# Patient Record
Sex: MALE | Race: OTHER
[De-identification: names, ages, dates, MRNs, and addresses within clinical notes are randomized per-mention and may not be internally consistent; named-entity substitution may affect disease eponyms.]

---

## 2019-01-02 ENCOUNTER — HOSPITAL ENCOUNTER (OUTPATIENT)
Dept: HOSPITAL 41 - JD.ED | Age: 34
Setting detail: OBSERVATION
LOS: 2 days | Discharge: HOME | End: 2019-01-04
Attending: SURGERY | Admitting: SURGERY
Payer: COMMERCIAL

## 2019-01-02 DIAGNOSIS — K35.80: Primary | ICD-10-CM

## 2019-01-02 PROCEDURE — 96376 TX/PRO/DX INJ SAME DRUG ADON: CPT

## 2019-01-02 PROCEDURE — 80053 COMPREHEN METABOLIC PANEL: CPT

## 2019-01-02 PROCEDURE — 74018 RADEX ABDOMEN 1 VIEW: CPT

## 2019-01-02 PROCEDURE — 96365 THER/PROPH/DIAG IV INF INIT: CPT

## 2019-01-02 PROCEDURE — 85027 COMPLETE CBC AUTOMATED: CPT

## 2019-01-02 PROCEDURE — 81001 URINALYSIS AUTO W/SCOPE: CPT

## 2019-01-02 PROCEDURE — 96375 TX/PRO/DX INJ NEW DRUG ADDON: CPT

## 2019-01-02 PROCEDURE — 85007 BL SMEAR W/DIFF WBC COUNT: CPT

## 2019-01-02 PROCEDURE — 90686 IIV4 VACC NO PRSV 0.5 ML IM: CPT

## 2019-01-02 PROCEDURE — 99285 EMERGENCY DEPT VISIT HI MDM: CPT

## 2019-01-02 PROCEDURE — 96361 HYDRATE IV INFUSION ADD-ON: CPT

## 2019-01-02 PROCEDURE — 44970 LAPAROSCOPY APPENDECTOMY: CPT

## 2019-01-02 PROCEDURE — 74177 CT ABD & PELVIS W/CONTRAST: CPT

## 2019-01-02 PROCEDURE — G0008 ADMIN INFLUENZA VIRUS VAC: HCPCS

## 2019-01-02 PROCEDURE — 96366 THER/PROPH/DIAG IV INF ADDON: CPT

## 2019-01-02 PROCEDURE — 82150 ASSAY OF AMYLASE: CPT

## 2019-01-02 PROCEDURE — 36415 COLL VENOUS BLD VENIPUNCTURE: CPT

## 2019-01-02 PROCEDURE — G0378 HOSPITAL OBSERVATION PER HR: HCPCS

## 2019-01-02 PROCEDURE — 86140 C-REACTIVE PROTEIN: CPT

## 2019-01-02 NOTE — EDM.PDOC
ED HPI GENERAL MEDICAL PROBLEM





- General


Chief Complaint: Abdominal Pain


Stated Complaint: ABDOMINAL PAIN


Time Seen by Provider: 01/02/19 23:51


Source of Information: Reports: Patient


History Limitations: Reports: No Limitations





- History of Present Illness


INITIAL COMMENTS - FREE TEXT/NARRATIVE: 


33-year-old male presents to the ED with right lower quadrant abdominal pain. 

Patient hasn't felt real well all day today. He did go to work but states he 

really hasn't had much appetite. Little bit of Gatorade to drink. He got home 

at 6:00 tonight and then went rate to bed. He states he did develop a little 

bit of fever and some chills. He did take 600 mg of Advil at about 2000 hrs. 

Initially the pain was mostly periumbilical but now has settled into his right 

lower quadrant of his abdomen. They walk to the hospital he found that he could 

not walk normally much slower rate than normal. States his bowels been working 

normally. He's had no previous abdominal surgery. Pain is well localized to the 

right lower quadrant and better if he's not moving. Does hurt to deep breathe 

and cough.





Onset: Today


Onset Date: 01/02/19


Onset Time: 14:00 (Started to have periumbilical discomfort about 2:00 today 

and then gradually moved down to the right lower quadrant over the last 4-6 

hours.)


Duration: Hour(s):


Location: Reports: Abdomen (Right lower quadrant of the abdomen.)


Quality: Reports: Ache, Other (Pain is constant)


Severity: Moderate (and worse with moving and deep breathing.)


Improves with: Reports: Rest


Worsens with: Reports: Movement (Coughing deep breathing and walking make it 

worse)


Context: Reports: Other.  Denies: Activity, Exercise, Lifting, Sick Contact, 

Trauma


Associated Symptoms: Reports: Fever/Chills (Stated he believed he had a low-

grade fever a few chills about 1800 hrs. tonight.)


Treatments PTA: Reports: NSAIDS (He took 6 mg of Motrin about 2000 hrs.)


  ** Right Lower Abdomen


Pain Score (Numeric/FACES): 6





- Related Data


 Allergies











Allergy/AdvReac Type Severity Reaction Status Date / Time


 


No Known Allergies Allergy   Verified 01/02/19 23:29











Home Meds: 


 Home Meds





. [No Known Home Meds]  01/02/19 [History]











Past Medical History





- Past Health History


Medical/Surgical History: Denies Medical/Surgical History





Social & Family History





- Tobacco Use


Smoking Status *Q: Never Smoker





- Recreational Drug Use


Recreational Drug Use: No





- Living Situation & Occupation


Living situation: Reports: 


Occupation: Employed





ED ROS GENERAL





- Review of Systems


Review Of Systems: See Below


Constitutional: Reports: Fever, Chills, Malaise, Decreased Appetite (Today)


HEENT: Reports: No Symptoms


Respiratory: Reports: No Symptoms


Cardiovascular: Reports: No Symptoms


Endocrine: Reports: No Symptoms


GI/Abdominal: Reports: Abdominal Pain (Right lower quadrant abdominal pain), 

Nausea


: Reports: No Symptoms (Mild nausea.)


Musculoskeletal: Reports: No Symptoms


Skin: Reports: No Symptoms


Neurological: Reports: No Symptoms


Psychiatric: Reports: No Symptoms


Hematologic/Lymphatic: Reports: No Symptoms


Immunologic: Reports: No Symptoms





ED EXAM, GI/ABD





- Physical Exam


Exam: See Below


Exam Limited By: No Limitations


General Appearance: Alert, WD/WN, No Apparent Distress, Other (Does feel very 

slightly warm to palpation.)


Eyes: Bilateral: Normal Appearance (No jaundice)


Throat/Mouth: Other (Tongue is mildly dry.)


Neck: Normal Inspection, Supple, Non-Tender, Full Range of Motion.  No: 

Lymphadenopathy (L), Lymphadenopathy (R)


Respiratory/Chest: No Respiratory Distress, Lungs Clear, Normal Breath Sounds, 

No Accessory Muscle Use, Chest Non-Tender


Cardiovascular: Normal Peripheral Pulses, Regular Rate, Rhythm, No Edema, No 

Gallop, No Murmur, No Rub


GI/Abdominal Exam: No Abnormal Bruit, No Mass, Pelvis Stable, Guarding (Right 

lower quadrant of the abdomen with mild guarding--over McBurney's point), Tender

, Abnormal Bowel Sounds (High-pitched intermittent bowel sounds), Other (Bowel 

sounds are few and far between but tend to be hyperactive in spurts .).  No: 

Rigid, Rebound


 (Male) Exam: No Hernia


Back Exam: Normal Inspection.  No: CVA Tenderness (L), CVA Tenderness (R)


Extremities: Normal Inspection, Normal Range of Motion, Non-Tender, No Pedal 

Edema


Neurological: Alert, Oriented, CN II-XII Intact, Normal Cognition, Normal Gait


Psychiatric: Normal Affect, Normal Mood


Skin Exam: Warm, Dry, Intact, Normal Color, No Rash





Course





- Vital Signs


Last Recorded V/S: 


 Last Vital Signs











Temp  36.5 C   01/02/19 23:30


 


Pulse  85   01/02/19 23:30


 


Resp  18   01/02/19 23:30


 


BP  140/90   01/02/19 23:30


 


Pulse Ox  98   01/02/19 23:30














- Orders/Labs/Meds


Orders: 


 Active Orders 24 hr











 Category Date Time Status


 


 Admission Status [Patient Status] [ADT] Routine ADT  01/03/19 04:41 Ordered


 


 Abdomen 1V Flat [CR] Stat Exams  01/02/19 23:52 Taken


 


 Abdomen Pelvis w Cont [CT] Stat Exams  01/03/19 00:28 Taken


 


 Sodium Chloride 0.9% [Normal Saline] 1,000 ml Med  01/02/19 23:45 Active





 IV ASDIRECTED   


 


 cefOXitin [Mefoxin in Dextrose,Iso-Osm 1 GM/50 ML] 1 gm Med  01/03/19 09:30 

Ordered





 Premix Bag 1 bag   





 IV ONETIME   








 Medication Orders





Sodium Chloride (Normal Saline)  1,000 mls @ 150 mls/hr IV ASDIRECTED ROHINI


   Last Admin: 01/03/19 00:21  Dose: 150 mls/hr








Labs: 


 Laboratory Tests











  01/02/19 01/02/19 01/03/19 Range/Units





  23:55 23:55 00:25 


 


WBC  11.25 H    (4.23-9.07)  K/mm3


 


RBC  5.59    (4.63-6.08)  M/mm3


 


Hgb  17.0    (13.7-17.5)  gm/L


 


Hct  49.4    (40.1-51.0)  %


 


MCV  88.4    (79.0-92.2)  fl


 


MCH  30.4    (25.7-32.2)  pg


 


MCHC  34.4    (32.2-35.5)  g/dl


 


RDW Std Deviation  39.6    (35.1-43.9)  fL


 


Plt Count  252    (163-337)  K/mm3


 


MPV  9.1 L    (9.4-12.3)  fl


 


Neutrophils % (Manual)  85 H    (40-60)  %


 


Band Neutrophils %  1    (0-10)  %


 


Lymphocytes % (Manual)  8 L    (20-40)  %


 


Atypical Lymphs %  0    %


 


Monocytes % (Manual)  5    (2-10)  %


 


Eosinophils % (Manual)  1    (0.8-7.0)  %


 


Basophils % (Manual)  0 L    (0.2-1.2)  


 


Platelet Estimate  Adequate    


 


RBC Morph Comment  Normal    


 


Sodium   139   (136-145)  mEq/L


 


Potassium   3.6   (3.5-5.1)  mEq/L


 


Chloride   102   ()  mEq/L


 


Carbon Dioxide   28   (21-32)  mEq/L


 


Anion Gap   12.6   (5-15)  


 


BUN   15   (7-18)  mg/dL


 


Creatinine   1.0   (0.7-1.3)  mg/dL


 


Est Cr Clr Drug Dosing   88.80   mL/min


 


Estimated GFR (MDRD)   > 60   (>60)  mL/min


 


BUN/Creatinine Ratio   15.0   (14-18)  


 


Glucose   159 H   ()  mg/dL


 


Calcium   9.5   (8.5-10.1)  mg/dL


 


Total Bilirubin   1.5 H   (0.2-1.0)  mg/dL


 


AST   46 H   (15-37)  U/L


 


ALT   142 H   (16-63)  U/L


 


Alkaline Phosphatase   131 H   ()  U/L


 


C-Reactive Protein   < 0.2   (<1.0)  mg/dL


 


Total Protein   7.9   (6.4-8.2)  g/dl


 


Albumin   4.1   (3.4-5.0)  g/dl


 


Globulin   3.8   gm/dL


 


Albumin/Globulin Ratio   1.1   (1-2)  


 


Amylase   65   ()  U/L


 


Urine Color    Yellow  (Yellow)  


 


Urine Appearance    Clear  (Clear)  


 


Urine pH    6.0  (5.0-8.0)  


 


Ur Specific Gravity    1.025  (1.005-1.030)  


 


Urine Protein    Trace H  (Negative)  


 


Urine Glucose (UA)    Negative  (Negative)  


 


Urine Ketones    Negative  (Negative)  


 


Urine Occult Blood    Negative  (Negative)  


 


Urine Nitrite    Negative  (Negative)  


 


Urine Bilirubin    Negative  (Negative)  


 


Urine Urobilinogen    0.2  (0.2-1.0)  


 


Ur Leukocyte Esterase    Negative  (Negative)  


 


Urine RBC    0-5  (0-5)  /hpf


 


Urine WBC    0-5  (0-5)  /hpf


 


Ur Epithelial Cells    0-5  (0-5)  /hpf


 


Urine Bacteria    Not seen  (FEW)  /hpf


 


Urine Mucus    Few  (FEW)  /hpf











Meds: 


Medications











Generic Name Dose Route Start Last Admin





  Trade Name Freq  PRN Reason Stop Dose Admin


 


Sodium Chloride  1,000 mls @ 150 mls/hr  01/02/19 23:45  01/03/19 00:21





  Normal Saline  IV   150 mls/hr





  ASDIRECTED ROHINI   Administration





     





     





     





     














Discontinued Medications














Generic Name Dose Route Start Last Admin





  Trade Name Jace  PRN Reason Stop Dose Admin


 


Hydromorphone HCl  0.5 mg  01/02/19 23:58  01/03/19 00:22





  Dilaudid  IVPUSH  01/02/19 23:59  0.5 mg





  ONETIME ONE   Administration





     





     





     





     


 


Hydromorphone HCl  1 mg  01/03/19 02:04  01/03/19 02:11





  Dilaudid  IVPUSH  01/03/19 02:05  1 mg





  ONETIME ONE   Administration





     





     





     





     


 


Cefoxitin Sodium 1 gm/ Premix  50 mls @ 100 mls/hr  01/03/19 02:24  01/03/19 02:

44





  IV  01/03/19 02:53  100 mls/hr





  ONETIME ONE   Administration





     





     





     





     


 


Metoclopramide HCl  10 mg  01/02/19 23:58  01/03/19 00:22





  Reglan  IVPUSH  01/02/19 23:59  10 mg





  ONETIME ONE   Administration





     





     





     





     


 


Ondansetron HCl  4 mg  01/03/19 02:04  01/03/19 02:10





  Zofran  IVPUSH  01/03/19 02:05  4 mg





  ONETIME ONE   Administration





     





     





     





     














- Radiology Interpretation


Free Text/Narrative:: 


33-year-old male presents to the ED with right lower quadrant abdominal pain. 

Patient hasn't felt well for the last 12-14 hours. He ate very little all day 

even at work. He went right to bed when he got off work at 1800 hrs. States he 

had diffuse periumbilical pain that seemed to get worse around 2:00 in the 

afternoon. Since then over the last 4-6 hours is now settled in his right lower 

quadrant of his abdomen. He did take an ibuprofen 600 mg about 2000 hrs. and he 

did seem to help alleviate the pain. However the pain is constant made worse by 

coughing deep breathing and walking. Denies any diarrhea or. Mildly nauseated. 

Still remains anorexic. Exam reveals guarding and pain well localized to the 

McBurney's point. Suspicion of appendicitis clinically. He also does feel 

slightly warm to palpation. Plan routine labs, urinalysis. IV will be normal 

saline at 150 mils per hour. Given Reglan 10 mg IV and Dilaudid 0.5 mg IV for 

nausea and pain relief. In view of the abdomen will be obtained first. If this 

does not show much will proceed with oral contrast to confirm appendicitis 

since he is not exhibiting severe peritonitis signs at this time








- Re-Assessments/Exams


Free Text/Narrative Re-Assessment/Exam: 





01/03/19 00:27  KUB does reveal increased stool in the cecum and most of the 

right hemicolon colon and parts of the transverse colon. No signs of bowel 

obstruction. Will proceed with CT of the abdomen pelvis with oral and IV 

contrast.





01/03/19 00:50 Labs reveal a slightly elevated white count 11.25 with 85% 

neutrophils and 1% band cells. Hemoglobin is 17.0 with hematocrit of 49.4 

suggesting some degree of hemoconcentration. White count is normal 202,000. 

Sodium 139 with a potassium of 3.6. Chloride 102 with a bicarbonate 28. And a 

gap is 12.6. BUN is 15. Creatinine is 1.0 GFR remains greater than 60. Glucose 

is mildly elevated at 159. Total bilirubin is mildly elevated 1.5. AST is 46 

and ALT is 142. Alk phosphatase is 131. C-reactive protein is pending amylase 

is normal at 65





01/03/19 01:13 C-reactive protein is less than 0.2. Patient reports the pain isn

't too bad as long as he holds still. It's worse with walking. His CT is 

scheduled for about 0135 hrs.





01/03/19 02:02  CT of the abdomen and pelvis has been completed. He does have a 

small hiatal hernia with contrast in the lower esophagus. Visualized lung bases 

are normal. Cardiac silhouette appears normal. Liver appears homogeneous 

without any ductal dilatation. Gallbladder is poorly seen but does not appear 

to contain any obvious calcified gallstones. Pancreas is normal. Both kidneys 

and ureters are normal. Adrenal glands appear normal. There is increased stool 

throughout the right hemicolon and transverse colon. There are few diverticula 

involving the sigmoid colon without any active diverticulitis. Appendix is 

visualized and is dilated to about 1 cm in size with mild patsy-appendiceal 

stranding compatible with early appendicitis. No signs of perforation or 

abscess. Plan I will give him further medication for pain relief Dilaudid 1mg  

and speak with the surgeon on call. He will also be started on antibiotics per 

surgeon's preference. I believe he could be operated on later this morning.





01/03/19 03:54  [facility] report has not yet become available. Patient is sleeping.





01/03/19 04:38 [facility] radiologist called to confirm findings of appendicitis on 

CT previously appreciated on my exam. I therefore did speak with on call 

surgeon Dr. Berrios and she requested the patient be admitted to the med 

surgery floor. I did write bridge orders in this regard. She will see him later 

this morning with plan for appendectomy and doubly around 10:30 this morning.














Departure





- Departure


Time of Disposition: 04:40


Disposition: Refer to Observation


Condition: Fair


Clinical Impression: 


Appendicitis, acute


Qualifiers:


 Acute appendicitis type: with localized peritonitis Appendicitis gangrene 

presence: without gangrene Appendicitis perforation presence: without 

perforation Appendicitis abscess presence: without abscess Qualified Code(s): 

K35.30 - Acute appendicitis with localized peritonitis, without perforation or 

gangrene








- Discharge Information


*PRESCRIPTION DRUG MONITORING PROGRAM REVIEWED*: Not Applicable


*COPY OF PRESCRIPTION DRUG MONITORING REPORT IN PATIENT [facility]: Not Applicable


Referrals: 


PCP,None [Primary Care Provider] - 


Forms:  ED Department Discharge


Additional Instructions: 


Bridge orders written for the patient be admitted to the med surgery floor 

under the care of .





- My Orders


Last 24 Hours: 


My Active Orders





01/02/19 23:45


Sodium Chloride 0.9% [Normal Saline] 1,000 ml IV ASDIRECTED 





01/02/19 23:52


Abdomen 1V Flat [CR] Stat 





01/03/19 00:28


Abdomen Pelvis w Cont [CT] Stat 





01/03/19 04:41


Admission Status [Patient Status] [ADT] Routine 





01/03/19 09:30


cefOXitin [Mefoxin in Dextrose,Iso-Osm 1 GM/50 ML] 1 gm   Premix Bag 1 bag IV 

ONETIME 














- Assessment/Plan


Last 24 Hours: 


My Active Orders





01/02/19 23:45


Sodium Chloride 0.9% [Normal Saline] 1,000 ml IV ASDIRECTED 





01/02/19 23:52


Abdomen 1V Flat [CR] Stat 





01/03/19 00:28


Abdomen Pelvis w Cont [CT] Stat 





01/03/19 04:41


Admission Status [Patient Status] [ADT] Routine 





01/03/19 09:30


cefOXitin [Mefoxin in Dextrose,Iso-Osm 1 GM/50 ML] 1 gm   Premix Bag 1 bag IV 

ONETIME

## 2019-01-03 RX ADMIN — HYDROCODONE BITARTRATE AND ACETAMINOPHEN PRN TAB: 5; 325 TABLET ORAL at 15:15

## 2019-01-03 RX ADMIN — HEPARIN SODIUM SCH UNITS: 5000 INJECTION, SOLUTION INTRAVENOUS; SUBCUTANEOUS at 15:13

## 2019-01-03 RX ADMIN — HYDROCODONE BITARTRATE AND ACETAMINOPHEN PRN TAB: 5; 325 TABLET ORAL at 20:03

## 2019-01-03 NOTE — PCM.OPNOTE
- General Post-Op/Procedure Note


Date of Surgery/Procedure: 01/03/19


Operative Procedure(s): Laparoscopic appendectomy


Findings: 





acute appendicitis, non-ruptured


Pre Op Diagnosis: acute appendicitis


Post-Op Diagnosis: same


Anesthesia Technique: General ET Tube


Primary Surgeon: Argenis Berrios


Anesthesia Provider: Jhon Zamudio


Pathology: 





appendix


Fluid Replacement, Intraop: 1,000


Output, Urine Amount: 0


EBL in mLs: 20


Complications: none apparent


Condition: Good

## 2019-01-03 NOTE — CT
CT abdomen and pelvis

 

Technique: Multiple axial sections were obtained from above the dome 

of the diaphragm inferiorly through the pubic symphysis.  Intravenous 

and oral contrast was utilized.  Delayed images were also obtained 

through the abdomen and pelvis.

 

Comparison: Previous abdominal x-ray performed earlier on the same day

 (12:11 AM).

 

Enlarged appendix is seen with minimal surrounding inflammatory 

change.  Findings are compatible with early appendicitis.

 

Visualized lung bases show nothing acute.  Minimal fatty infiltration 

is noted within the liver.  Spleen appears within normal limits.  

Adrenal glands show no nodule.  Pancreas is within normal limits.  

Kidneys show symmetric contrast enhancement without hydronephrosis or 

mass.  Delayed images show contrast within the ureters which show no 

dilatation.  Contrast noted within the bladder on delayed images.

 

Aorta shows no aneurysm.  Gallbladder contains no calcified 

gallstones.  No retroperitoneal adenopathy or mesenteric abnormalities

 are seen.  No pelvic mass or adenopathy is seen.

 

Small amount of contrast is noted within the distal esophagus 

compatible with mild gastroesophageal reflux.

 

Bone window settings were reviewed which appear within normal limits 

for the patient's age.

 

Impression:

1.  Enlarged appendix with slight surrounding inflammatory change 

compatible with early appendicitis.

2.  Mild gastroesophageal reflux.

3.  Minimal fatty infiltration within the liver.

 

Diagnostic code #5

 

I agree with preliminary report from [facility], finalized on 01/03/19, 5:21

 AM Central Time

## 2019-01-03 NOTE — CR
Abdomen: Supine view of the abdomen was obtained.

 

Comparison: No prior abdominal x-ray.

 

Bowel gas pattern is normal.  Bony structures are unremarkable.  No 

abnormal calcifications or soft tissue abnormality is seen.

 

Impression:

1.  Unremarkable supine abdominal x-ray.

 

Diagnostic code #1

## 2019-01-03 NOTE — PCM.POSTAN
POST ANESTHESIA ASSESSMENT





- MENTAL STATUS


Mental Status: Alert, Oriented





- VITAL SIGNS


Pulse Rate: 94


SaO2: 100


Resp Rate: 16


Blood Pressure: 125/87


Temperature: 98.4 F





- RESPIRATORY


Respiratory Status: Respiratory Rate WNL, Airway Patent, O2 Saturation Stable, 

Supplemental Oxygen





- CARDIOVASCULAR


CV Status: Pulse Rate WNL, Blood Pressure Stable





- GASTROINTESTINAL


GI Status: No Symptoms





- PAIN


Pain Score: 3





- POST OP HYDRATION


Hydration Status: Adequate & Stable

## 2019-01-03 NOTE — PCM.PRNOTE
- Free Text/Narrative


Note: 





Operative Report





Date of surgery: January 3, 2019


Preoperative diagnosis: acute appendicitis.


Postoperative diagnosis: same


Procedure performed: laparoscopic appendectomy





Surgeon: Dr. Argenis Berrios


Anesthesia:  General 


Anesthetist: Jhon Zamudio CRNA





Estimated blood loss 20 mL


IV fluids: 1000 mL crystalloid


Urine output: 0


Drains and lines: .  None





Findings: Acute appendicitis, nonruptured


Pathology: Appendix





Indications for procedure:  The patient is a 33-year-old gentleman who 

presented to the emergency department complaining of abdominal pain that was 

consistent with acute appendicitis.  He underwent CT scan of the abdomen and 

pelvis which revealed a dilated inflamed appendix.  He also had laboratory 

testing which revealed a mildly elevated WBC with a neutrophil predominance.  

He is admitted to the floor and given IV antibiotics in preparation for 

appendectomy.  He was counseled for laparoscopic appendectomy with discussion 

of the risks.  His written consent was obtained. 





Description of procedure: The patient was taken back to the operating room and 

placed in supine position on the operating table. SCD boots were in place and 

functional prior to the start of the procedure.  Preoperative antibiotics were 

not given since he had received a recent dose of IV antibiotics on the surgical 

floor. [] The patient had successful induction of general anesthesia and was 

intubated without difficulty.  Pt was then prepped and draped in standard 

surgical fashion and a timeout was performed.





We began by making a 15 mm incision in the infraumbilical skin and deepened 

down to level of the fascia which was then grasped and incised sharply.  We 

entered the peritoneum and then placed stay sutures of 0 Vicryl on the fascial 

edges.  A 12 mm Russell port was then placed into the umbilicus and the balloon 

was inflated.  The abdomen was insufflated to 15 mmHg a 5 mm camera was 

inserted.  There was no evidence of any injury created from entry into the 

abdomen.  A TA P block was performed using mixed 1% lidocaine with epinephrine 

and 0.5% bupivacaine with epinephrine . We then proceeded to place a 5 mm port 

under direct visualization in the suprapubic midline and an additional 5mm port 

in the left lower quadrant.  The patient was then positioned in Trendelenburg 

with right side elevated and we proceeded to mobilize the appendix.  The 

appendix was injected, consistent with acute appendicitis.  There was a scant 

amount of fluid in the abdomen, with no evidence of purulence or rupture from 

the appendix.  The appendix was then grasped and with blunt dissection was 

brought into the surgical field.  The mesoappendix dissected from the appendix.

  The appendix was then taken with a tissue staple load.  The mesoappendix was 

then taken with .  A vascular staple load.





The specimen was in place in the Endo Catch bag.  We then inspected and blotted 

up any blood in the area using a Ray-Davida in the abdomen.  There was no active 

bleeding at the end of this case.  All Ray-Davida's were removed. The abdomen was 

then desufflated and the umbilical fascia closed with 0 Vicryl sutures and the 

stay sutures were tied, effectively closing the umbilical port site.  The skin 

was then reapproximated at all port sites using a 4-0 Monocryl subcutaneous 

stitch and covered with Dermabond surgical glue.





The patient tolerated the procedure.  He was extubated and transported to the 

PACU in stable condition.





All sponge and needle counts were correct. 





Argenis Berrios MD


General Surgery

## 2019-01-03 NOTE — PCM.HP
H&P History of Present Illness





- General


Date of Service: 01/03/19


Admit Problem/Dx: 


 Admission Diagnosis/Problem





Admission Diagnosis/Problem      Acute appendicitis








Source of Information: Patient, Provider


History Limitations: Reports: No Limitations





- History of Present Illness


Initial Comments - Free Text/Narative: 





Pt is a 34 y/o male who presents with RLQ abdominal pain. He reports more 

diffuse abdominal pain in the lower quadrants, worse with movement and driving 

over bumps. He reports having diarrhea yesterday after drinking the PO contrast 

for the CT scan. He had some rigors and chills on the way to the hospital. He 

has been feeling improved since his admission and pain is better. He denies 

nausea/vomiting.


  ** Right Lower Abdomen


Pain Score (Numeric/FACES): 6





- Related Data


Allergies/Adverse Reactions: 


 Allergies











Allergy/AdvReac Type Severity Reaction Status Date / Time


 


No Known Allergies Allergy   Verified 01/03/19 07:38











Home Medications: 


 Home Meds





. [No Known Home Meds]  01/02/19 [History]











Past Medical History





- Past Health History


Medical/Surgical History: Denies Medical/Surgical History





- Past Surgical History


Musculoskeletal Surgical History: Reports: Other (See Below)


Other Musculoskeletal Surgeries/Procedures:: history of left broken leg and has 

not had any surgery on it.  





Social & Family History





- Family History


Endocrine/Metabolic: Reports: Diabetes, type II





- Tobacco Use


Smoking Status *Q: Never Smoker


Second Hand Smoke Exposure: No





- Caffeine Use


Caffeine Use: Reports: Coffee, Soda


Other Caffeine Use: one cup of coffee every week and one can of soda maybe 

every 3 weeks.





- Recreational Drug Use


Recreational Drug Use: No





- Living Situation & Occupation


Living situation: Reports: 


Occupation: Employed





H&P Review of Systems





- Review of Systems:


Review Of Systems: See Below


General: Reports: Fever (subjective), Chills


HEENT: Reports: No Symptoms


Pulmonary: Reports: No Symptoms


Cardiovascular: Reports: No Symptoms


Gastrointestinal: Reports: Abdominal Pain, Diarrhea.  Denies: Black Stool, 

Bloody Stool


Genitourinary: Reports: No Symptoms


Musculoskeletal: Reports: No Symptoms


Skin: Reports: No Symptoms


Psychiatric: Reports: No Symptoms


Neurological: Reports: No Symptoms





Exam





- Exam


Exam: See Below





- Vital Signs


Vital Signs: 


 Last Vital Signs











Temp  36.7 C   01/03/19 10:06


 


Pulse  59 L  01/03/19 10:06


 


Resp  18   01/03/19 10:06


 


BP  122/68   01/03/19 10:06


 


Pulse Ox  97   01/03/19 10:06











Weight: 76.566 kg





- Exam


General: Alert, Oriented


HEENT: Conjunctiva Clear, EOMI


Neck: Supple


Lungs: Clear to Auscultation, Normal Respiratory Effort


Cardiovascular: Regular Rate, Regular Rhythm


GI/Abdominal Exam: Soft, Distended (mild), Rebound (in RLQ), Tender (in Right 

hemiabdomen), Other (rovsing's sign)


Neurological: Cranial Nerves Intact


Neuro Extensive - Mental Status: Alert, Normal Mood/Affect





- Patient Data


Lab Results Last 24 hrs: 


 Laboratory Results - last 24 hr











  01/02/19 01/02/19 01/03/19 Range/Units





  23:55 23:55 00:25 


 


WBC  11.25 H    (4.23-9.07)  K/mm3


 


RBC  5.59    (4.63-6.08)  M/mm3


 


Hgb  17.0    (13.7-17.5)  gm/L


 


Hct  49.4    (40.1-51.0)  %


 


MCV  88.4    (79.0-92.2)  fl


 


MCH  30.4    (25.7-32.2)  pg


 


MCHC  34.4    (32.2-35.5)  g/dl


 


RDW Std Deviation  39.6    (35.1-43.9)  fL


 


Plt Count  252    (163-337)  K/mm3


 


MPV  9.1 L    (9.4-12.3)  fl


 


Neutrophils % (Manual)  85 H    (40-60)  %


 


Band Neutrophils %  1    (0-10)  %


 


Lymphocytes % (Manual)  8 L    (20-40)  %


 


Atypical Lymphs %  0    %


 


Monocytes % (Manual)  5    (2-10)  %


 


Eosinophils % (Manual)  1    (0.8-7.0)  %


 


Basophils % (Manual)  0 L    (0.2-1.2)  


 


Platelet Estimate  Adequate    


 


RBC Morph Comment  Normal    


 


Sodium   139   (136-145)  mEq/L


 


Potassium   3.6   (3.5-5.1)  mEq/L


 


Chloride   102   ()  mEq/L


 


Carbon Dioxide   28   (21-32)  mEq/L


 


Anion Gap   12.6   (5-15)  


 


BUN   15   (7-18)  mg/dL


 


Creatinine   1.0   (0.7-1.3)  mg/dL


 


Est Cr Clr Drug Dosing   88.80   mL/min


 


Estimated GFR (MDRD)   > 60   (>60)  mL/min


 


BUN/Creatinine Ratio   15.0   (14-18)  


 


Glucose   159 H   ()  mg/dL


 


Calcium   9.5   (8.5-10.1)  mg/dL


 


Total Bilirubin   1.5 H   (0.2-1.0)  mg/dL


 


AST   46 H   (15-37)  U/L


 


ALT   142 H   (16-63)  U/L


 


Alkaline Phosphatase   131 H   ()  U/L


 


C-Reactive Protein   < 0.2   (<1.0)  mg/dL


 


Total Protein   7.9   (6.4-8.2)  g/dl


 


Albumin   4.1   (3.4-5.0)  g/dl


 


Globulin   3.8   gm/dL


 


Albumin/Globulin Ratio   1.1   (1-2)  


 


Amylase   65   ()  U/L


 


Urine Color    Yellow  (Yellow)  


 


Urine Appearance    Clear  (Clear)  


 


Urine pH    6.0  (5.0-8.0)  


 


Ur Specific Gravity    1.025  (1.005-1.030)  


 


Urine Protein    Trace H  (Negative)  


 


Urine Glucose (UA)    Negative  (Negative)  


 


Urine Ketones    Negative  (Negative)  


 


Urine Occult Blood    Negative  (Negative)  


 


Urine Nitrite    Negative  (Negative)  


 


Urine Bilirubin    Negative  (Negative)  


 


Urine Urobilinogen    0.2  (0.2-1.0)  


 


Ur Leukocyte Esterase    Negative  (Negative)  


 


Urine RBC    0-5  (0-5)  /hpf


 


Urine WBC    0-5  (0-5)  /hpf


 


Ur Epithelial Cells    0-5  (0-5)  /hpf


 


Urine Bacteria    Not seen  (FEW)  /hpf


 


Urine Mucus    Few  (FEW)  /hpf











Result Diagrams: 


 01/02/19 23:55





 01/02/19 23:55





*Q Meaningful Use (ADM)





- VTE Risk Assess *Q


Each Risk Factor Represents 1 Point: Minor Surgery Planned


Total Score 1 Point Risk Factors: 1





- Problem List


(1) Appendicitis, acute


SNOMED Code(s): 10259880


   ICD Code: K35.80 - UNSPECIFIED ACUTE APPENDICITIS   Status: Acute   Current 

Visit: Yes   


Qualifiers: 


   Acute appendicitis type: with localized peritonitis   Appendicitis gangrene 

presence: without gangrene   Appendicitis perforation presence: without 

perforation   Appendicitis abscess presence: without abscess   Qualified Code(s)

: K35.30 - Acute appendicitis with localized peritonitis, without perforation 

or gangrene   


Problem List Initiated/Reviewed/Updated: Yes


Orders Last 24hrs: 


 Active Orders 24 hr











 Category Date Time Status


 


 Admission Status [Patient Status] [ADT] Routine ADT  01/03/19 04:41 Active


 


 Up With Assistance [RC] ASDIRECTED Care  01/03/19 06:58 Active


 


 NPO Now [Nothing per Oral Now Diet] [DIET] Diet  01/03/19 Breakfast Active


 


 Dextrose 5%-0.9% NaCl [Dextrose 5%-Normal Saline] 1,000 Med  01/03/19 07:00 

Active





 ml   





 IV ASDIRECTED   


 


 HYDROmorphone [Dilaudid] Med  01/03/19 07:01 Active





 0.5 mg IVPUSH Q2H PRN   


 


 Metoclopramide [Reglan] Med  01/03/19 07:02 Active





 10 mg IVPUSH Q6H PRN   


 


 Code Status [Resuscitation Status] Routine Resus Stat  01/03/19 06:57 Ordered








 Medication Orders





Hydromorphone HCl (Dilaudid)  0.5 mg IVPUSH Q2H PRN


   PRN Reason: Pain


Dextrose/Sodium Chloride (Dextrose 5%-Normal Saline)  1,000 mls @ 150 mls/hr IV 

ASDIRECTED ROHINI


   Last Admin: 01/03/19 07:26  Dose: 150 mls/hr


Metoclopramide HCl (Reglan)  10 mg IVPUSH Q6H PRN


   PRN Reason: Nausea








Assessment/Plan Comment:: 





34 y/o male with acute appendicitis


- plan for laparoscopic cholecystectomy


- NPO with IVF


- continue cefoxitin IV


- bilateral SCDs with subq heparin


- admitted for observation.





Argenis Berrios MD


General surgery

## 2019-01-03 NOTE — PCM.PREANE
Preanesthetic Assessment





- Procedure


Proposed Procedure: 





lap appy





- Anesthesia/Transfusion/Family Hx


Anesthesia History: No Prior Anesthesia


Family History of Anesthesia Reaction: No


Transfusion History: No Prior Transfusion(s)





- Review of Systems


General: Fever (last night and abd pain)


Pulmonary: No Symptoms


Cardiovascular: No Symptoms


Gastrointestinal: Abdominal Pain, Decreased Appetite


Neurological: No Symptoms





- Physical Assessment


NPO Status Date: 01/03/19


NPO Status Time: 01:00


Pulse: 59


O2 Sat by Pulse Oximetry: 97


Respiratory Rate: 18


Blood Pressure: 122/68


Temperature: 98.1 F


Vital Signs: 





 Last Vital Signs











Temp  98.1 F   01/03/19 10:06


 


Pulse  59 L  01/03/19 10:06


 


Resp  18   01/03/19 10:06


 


BP  122/68   01/03/19 10:06


 


Pulse Ox  97   01/03/19 10:06











Height: 5 ft 4 in


Weight: 76.566 kg


ASA Class: 2


Mental Status: Alert & Oriented x3


Airway Class: Mallampati = 1


Dentition: Reports: Normal Dentition


Thyro-Mental Finger Breadths: 3


Mouth Opening Finger Breadths: 3


ROM/Head Extension: Full


Lungs: Clear to Auscultation, Normal Respiratory Effort


Cardiovascular: Regular Rate, Regular Rhythm





- Lab


Values: 





 Laboratory Last Values











WBC  11.25 K/mm3 (4.23-9.07)  H  01/02/19  23:55    


 


RBC  5.59 M/mm3 (4.63-6.08)   01/02/19  23:55    


 


Hgb  17.0 gm/L (13.7-17.5)   01/02/19  23:55    


 


Hct  49.4 % (40.1-51.0)   01/02/19  23:55    


 


MCV  88.4 fl (79.0-92.2)   01/02/19  23:55    


 


MCH  30.4 pg (25.7-32.2)   01/02/19  23:55    


 


MCHC  34.4 g/dl (32.2-35.5)   01/02/19  23:55    


 


RDW Std Deviation  39.6 fL (35.1-43.9)   01/02/19  23:55    


 


Plt Count  252 K/mm3 (163-337)   01/02/19  23:55    


 


MPV  9.1 fl (9.4-12.3)  L  01/02/19  23:55    


 


Neutrophils % (Manual)  85 % (40-60)  H  01/02/19  23:55    


 


Band Neutrophils %  1 % (0-10)   01/02/19  23:55    


 


Lymphocytes % (Manual)  8 % (20-40)  L  01/02/19  23:55    


 


Atypical Lymphs %  0 %  01/02/19  23:55    


 


Monocytes % (Manual)  5 % (2-10)   01/02/19  23:55    


 


Eosinophils % (Manual)  1 % (0.8-7.0)   01/02/19  23:55    


 


Basophils % (Manual)  0  (0.2-1.2)  L  01/02/19  23:55    


 


Platelet Estimate  Adequate   01/02/19  23:55    


 


RBC Morph Comment  Normal   01/02/19  23:55    


 


Sodium  139 mEq/L (136-145)   01/02/19  23:55    


 


Potassium  3.6 mEq/L (3.5-5.1)   01/02/19  23:55    


 


Chloride  102 mEq/L ()   01/02/19  23:55    


 


Carbon Dioxide  28 mEq/L (21-32)   01/02/19  23:55    


 


Anion Gap  12.6  (5-15)   01/02/19  23:55    


 


BUN  15 mg/dL (7-18)   01/02/19  23:55    


 


Creatinine  1.0 mg/dL (0.7-1.3)   01/02/19  23:55    


 


Est Cr Clr Drug Dosing  88.80 mL/min  01/02/19  23:55    


 


Estimated GFR (MDRD)  > 60 mL/min (>60)   01/02/19  23:55    


 


BUN/Creatinine Ratio  15.0  (14-18)   01/02/19  23:55    


 


Glucose  159 mg/dL ()  H  01/02/19  23:55    


 


Calcium  9.5 mg/dL (8.5-10.1)   01/02/19  23:55    


 


Total Bilirubin  1.5 mg/dL (0.2-1.0)  H  01/02/19  23:55    


 


AST  46 U/L (15-37)  H  01/02/19  23:55    


 


ALT  142 U/L (16-63)  H  01/02/19  23:55    


 


Alkaline Phosphatase  131 U/L ()  H  01/02/19  23:55    


 


C-Reactive Protein  < 0.2 mg/dL (<1.0)   01/02/19  23:55    


 


Total Protein  7.9 g/dl (6.4-8.2)   01/02/19  23:55    


 


Albumin  4.1 g/dl (3.4-5.0)   01/02/19  23:55    


 


Globulin  3.8 gm/dL  01/02/19  23:55    


 


Albumin/Globulin Ratio  1.1  (1-2)   01/02/19  23:55    


 


Amylase  65 U/L ()   01/02/19  23:55    


 


Urine Color  Yellow  (Yellow)   01/03/19  00:25    


 


Urine Appearance  Clear  (Clear)   01/03/19  00:25    


 


Urine pH  6.0  (5.0-8.0)   01/03/19  00:25    


 


Ur Specific Gravity  1.025  (1.005-1.030)   01/03/19  00:25    


 


Urine Protein  Trace  (Negative)  H  01/03/19  00:25    


 


Urine Glucose (UA)  Negative  (Negative)   01/03/19  00:25    


 


Urine Ketones  Negative  (Negative)   01/03/19  00:25    


 


Urine Occult Blood  Negative  (Negative)   01/03/19  00:25    


 


Urine Nitrite  Negative  (Negative)   01/03/19  00:25    


 


Urine Bilirubin  Negative  (Negative)   01/03/19  00:25    


 


Urine Urobilinogen  0.2  (0.2-1.0)   01/03/19  00:25    


 


Ur Leukocyte Esterase  Negative  (Negative)   01/03/19  00:25    


 


Urine RBC  0-5 /hpf (0-5)   01/03/19  00:25    


 


Urine WBC  0-5 /hpf (0-5)   01/03/19  00:25    


 


Ur Epithelial Cells  0-5 /hpf (0-5)   01/03/19  00:25    


 


Urine Bacteria  Not seen /hpf (FEW)   01/03/19  00:25    


 


Urine Mucus  Few /hpf (FEW)   01/03/19  00:25    














- Allergies


Allergies/Adverse Reactions: 


 Allergies











Allergy/AdvReac Type Severity Reaction Status Date / Time


 


No Known Allergies Allergy   Verified 01/03/19 07:38














- Blood


Blood Available: No





- Acknowledgements


Anesthesia Type Planned: General Anesthesia


Pt an Appropriate Candidate for the Planned Anesthesia: Yes


Alternatives and Risks of Anesthesia Discussed w Pt/Guardian: Yes


Pt/Guardian Understands and Agrees with Anesthesia Plan: Yes





PreAnesthesia Questionnaire





- Past Health History


Medical/Surgical History: Denies Medical/Surgical History


Respiratory History: Reports: None


Gastrointestinal History: Reports: None





- Past Surgical History


Musculoskeletal Surgical History: Reports: Other (See Below)


Other Musculoskeletal Surgeries/Procedures:: history of left broken leg and has 

not had any surgery on it.  





- History Comment


History Comment: vitamin and fish oil





- SUBSTANCE USE


Smoking Status *Q: Never Smoker


Tobacco Use Within Last Twelve Months: No


Second Hand Smoke Exposure: Yes


Days Per Week of Alcohol Use: 0


Recreational Drug Use History: No





- HOME MEDS


Home Medications: 


 Home Meds





. [No Known Home Meds]  01/02/19 [History]











- CURRENT (IN HOUSE) MEDS


Current Meds: 





 Current Medications





Hydromorphone HCl (Dilaudid)  0.5 mg IVPUSH Q2H PRN


   PRN Reason: Pain


Dextrose/Sodium Chloride (Dextrose 5%-Normal Saline)  1,000 mls @ 150 mls/hr IV 

[facility]


   Last Admin: 01/03/19 07:26 Dose:  150 mls/hr


Metoclopramide HCl (Reglan)  10 mg IVPUSH Q6H PRN


   PRN Reason: Nausea





Discontinued Medications





Fentanyl (Sublimaze) Confirm Administered Dose 250 mcg .ROUTE .STK-MED ONE


   Stop: 01/03/19 10:48


Hydromorphone HCl (Dilaudid)  0.5 mg IVPUSH ONETIME ONE


   Stop: 01/02/19 23:59


   Last Admin: 01/03/19 00:22 Dose:  0.5 mg


Hydromorphone HCl (Dilaudid)  1 mg IVPUSH ONETIME ONE


   Stop: 01/03/19 02:05


   Last Admin: 01/03/19 02:11 Dose:  1 mg


Sodium Chloride (Normal Saline)  1,000 mls @ 150 mls/hr IV [facility]RECTED [facility]


   Last Admin: 01/03/19 00:21 Dose:  150 mls/hr


Cefoxitin Sodium 1 gm/ Premix  50 mls @ 100 mls/hr IV ONETIME ONE


   Stop: 01/03/19 02:53


   Last Admin: 01/03/19 02:44 Dose:  100 mls/hr


Cefoxitin Sodium 1 gm/ Premix  50 mls @ 100 mls/hr IV ONETIME ONE


   Stop: 01/03/19 10:59


   Last Admin: 01/03/19 10:44 Dose:  100 mls/hr


Lidocaine HCl (Xylocaine-Mpf 1%) Confirm Administered Dose 4 mls @ as directed 

.ROUTE .STK-MED ONE


   Stop: 01/03/19 10:47


Influenza Virus Vaccine (Pharmacy To Dose - Influenza Vaccine)  1 each IM 

ONETIME ONE


   Stop: 01/03/19 07:32


Influenza Virus Vaccine (Fluzone Quad [id number] Syringe)  60 mcg IM .ONCE ONE


   Stop: 01/03/19 07:46


Metoclopramide HCl (Reglan)  10 mg IVPUSH ONETIME ONE


   Stop: 01/02/19 23:59


   Last Admin: 01/03/19 00:22 Dose:  10 mg


Midazolam HCl (Versed 1 Mg/Ml) Confirm Administered Dose 2 mg .ROUTE .STK-MED 

ONE


   Stop: 01/03/19 10:47


Ondansetron HCl (Zofran)  4 mg IVPUSH ONETIME ONE


   Stop: 01/03/19 02:05


   Last Admin: 01/03/19 02:10 Dose:  4 mg


Ondansetron HCl (Zofran) Confirm Administered Dose 4 mg .ROUTE .STK-MED ONE


   Stop: 01/03/19 10:47


Propofol (Diprivan  20 Ml) Confirm Administered Dose 200 mg .ROUTE .STK-MED ONE


   Stop: 01/03/19 10:47


Rocuronium Bromide (Zemuron) Confirm Administered Dose 50 mg .ROUTE .STK-MED ONE


   Stop: 01/03/19 10:47

## 2019-01-04 RX ADMIN — HEPARIN SODIUM SCH UNITS: 5000 INJECTION, SOLUTION INTRAVENOUS; SUBCUTANEOUS at 00:27

## 2019-01-04 RX ADMIN — HYDROCODONE BITARTRATE AND ACETAMINOPHEN PRN TAB: 5; 325 TABLET ORAL at 00:25

## 2019-01-04 RX ADMIN — HEPARIN SODIUM SCH UNITS: 5000 INJECTION, SOLUTION INTRAVENOUS; SUBCUTANEOUS at 07:56

## 2019-01-04 RX ADMIN — HYDROCODONE BITARTRATE AND ACETAMINOPHEN PRN TAB: 5; 325 TABLET ORAL at 07:49

## 2019-01-04 NOTE — PCM.DCSUM1
**Discharge Summary





- Hospital Course


Free Text/Narrative:: 





Pt was admitted through the ED with acute appendicitis. He underwent an 

uneventful appendectomy, and was kept overnight. He tolerated a diet and was 

urinating without difficulty. He was discharged home on POD1. 


Diagnosis: Stroke: No


Modified Violetta Scale: No Signif.Disability Despite Sympt.Able to Carry Out 

Usual Act./Duties


Modified Violetta Scale Score: 1





- Discharge Data


Discharge Date: 01/04/19


Discharge Disposition: Home, Self-Care 01


Condition: Good





- Discharge Diagnosis/Problem(s)


(1) Appendicitis, acute


SNOMED Code(s): 68599082


   ICD Code: K35.80 - UNSPECIFIED ACUTE APPENDICITIS   Status: Acute   Current 

Visit: Yes   


Qualifiers: 


   Acute appendicitis type: with localized peritonitis   Appendicitis gangrene 

presence: without gangrene   Appendicitis perforation presence: without 

perforation   Appendicitis abscess presence: without abscess   Qualified Code(s)

: K35.30 - Acute appendicitis with localized peritonitis, without perforation 

or gangrene   





- Patient Summary/Data


Operative Procedure(s) Performed: Laparoscopic appendectomy





- Patient Instructions


Diet: Usual Diet as Tolerated


Activity: As Tolerated, No Lifting Over 20 Pounds (for two weeks), No Strenuous 

Activities (for 2 weeks)


Activity, Other: No lifting more than 30lbs until 2/1/19.


Showering/Bathing: May Shower, No Tub Bathing/Swimming (for 2 weeks)


Notify Provider of: Fever, Increased Pain, Swelling and Redness, Drainage, 

Nausea and/or Vomiting





- Discharge Plan


*PRESCRIPTION DRUG MONITORING PROGRAM REVIEWED*: Not Applicable


*COPY OF PRESCRIPTION DRUG MONITORING REPORT IN PATIENT [place]: Not Applicable


Prescriptions/Med Rec: 


Ibuprofen 600 mg PO Q6HR PRN 20 Days #120 tablet


 PRN Reason: Pain (Moderate 4-6)


Acetaminophen/HYDROcodone [Norco 325-5 MG] 1 tab PO Q4H PRN 14 Days #30 tablet


 PRN Reason: Pain


Docusate Sodium [Colace] 100 mg PO BID 20 Days #40 cap


Home Medications: 


 Home Meds





Acetaminophen/HYDROcodone [Norco 325-5 MG] 1 tab PO Q4H PRN 14 Days #30 tablet 

01/03/19 [Rx]


Docusate Sodium [Colace] 100 mg PO BID 20 Days #40 cap 01/03/19 [Rx]


Ibuprofen 600 mg PO Q6HR PRN 20 Days #120 tablet 01/03/19 [Rx]








Patient Handouts:  Laparoscopic Appendectomy, Adult, Care After, Easy-to-Read


Referrals: 


PCP,None [Primary Care Provider] - 


Beatriz Bryan NP [Nurse Practitioner] - 01/17/19 9:20 am (You will have a 

follow up appointment to see Beatriz Bryan NP at [facility] on Thursday, 

January 17th at 9:20 AM)





- Discharge Summary/Plan Comment


DC Time >30 min.: No





- Patient Data


Vitals - Most Recent: 


 Last Vital Signs











Temp  36.7 C   01/04/19 07:52


 


Pulse  64   01/04/19 07:52


 


Resp  16   01/04/19 07:52


 


BP  103/69   01/04/19 07:52


 


Pulse Ox  94 L  01/04/19 07:52











Weight - Most Recent: 78.608 kg


I&O - Last 24 hours: 


 Intake & Output











 01/03/19 01/04/19 01/04/19





 22:59 06:59 14:59


 


Intake Total 240 800 


 


Balance 240 800 











Med Orders - Current: 


 Current Medications





Hydrocodone Bitart/Acetaminophen (Norco 325-5 Mg)  1 tab PO Q4H PRN


   PRN Reason: Pain (severe 7-10)


   Last Admin: 01/04/19 07:49 Dose:  1 tab


Heparin Sodium (Porcine) (Heparin Sodium)  5,000 units SUBCUT Q8H ROHINI


   Last Admin: 01/04/19 07:56 Dose:  5,000 units


Ibuprofen (Motrin)  600 mg PO Q6H PRN


   PRN Reason: Pain (moderate 4-6)


   Last Admin: 01/03/19 17:32 Dose:  600 mg


Metoclopramide HCl (Reglan)  10 mg IVPUSH Q6H PRN


   PRN Reason: Nausea





Discontinued Medications





Bupivacaine HCl/Epinephrine Bitart (Marcaine 0.5%/Epinephrine 1:200,000) 

Confirm Administered Dose 50 ml .ROUTE .STK-MED ONE


   Stop: 01/03/19 11:46


   Last Admin: 01/03/19 12:25 Dose:  30 ml


Fentanyl (Sublimaze) Confirm Administered Dose 250 mcg .ROUTE .STK-MED ONE


   Stop: 01/03/19 10:48


Fentanyl (Sublimaze)  50 mcg IVPUSH Q5M PRN


   PRN Reason: Pain


   Stop: 01/03/19 18:00


Glycopyrrolate () Confirm Administered Dose 1 mg .ROUTE .[facility]-MED ONE


   Stop: 01/03/19 12:54


Hydromorphone HCl (Dilaudid)  0.5 mg IVPUSH ONETIME ONE


   Stop: 01/02/19 23:59


   Last Admin: 01/03/19 00:22 Dose:  0.5 mg


Hydromorphone HCl (Dilaudid)  1 mg IVPUSH ONETIME ONE


   Stop: 01/03/19 02:05


   Last Admin: 01/03/19 02:11 Dose:  1 mg


Hydromorphone HCl (Dilaudid)  0.5 mg IVPUSH Q2H PRN


   PRN Reason: Pain


Hydromorphone HCl (Dilaudid)  0.5 mg IVPUSH Q10M PRN


   PRN Reason: Pain (severe 7-10)


   Stop: 01/03/19 18:00


Hydromorphone HCl (Dilaudid) Confirm Administered Dose 0.5 mg .ROUTE .STK-MED 

ONE


   Stop: 01/03/19 12:39


Sodium Chloride (Normal Saline)  1[facility]000 mls [facility]


   Last Admin: 01/03/19 00:21 Dose:  150 mls/hr


Cefoxitin Sodium 1 gm/ Premix  50 mls @ 100 mls/hr IV ONETIME ONE


   Stop: 01/03/19 02:53


   Last Admin: 01/03/19 02:44 Dose:  100 mls/hr


Dextrose/Sodium Chloride (Dextrose 5%-Normal Saline)  1,000 mls @ 150 [facility]ls[facility] 

[facility]


   Last Admin: 01/03/19 07:26 Dose:  150 mls/hr


Cefoxitin Sodium 1 gm/ Premix  50 mls @ 100 mls/hr IV ONETIME ONE


   Stop: 01/03/19 10:59


   Last Admin: 01/03/19 10:44 Dose:  100 mls/hr


Lidocaine HCl (Xylocaine-Mpf 1%) Confirm Administered Dose 4 mls @ as directed 

.ROUTE .[facility] ONE


   Stop: 01/03/19 10:47


Lactated Ringer's (Ringers, Lactated) Confirm Administered Dose 1,000 mls @ as 

directed .ROUTE .[facility] ONE


   Stop: 01/03/19 13:06


Influenza Virus Vaccine (Pharmacy To Dose - Influenza Vaccine)  1 each IM 

ONETIME ONE


   Stop: 01/03/19 07:32


Influenza Virus Vaccine (Fluzone Quad [id number] Syringe)  60 mcg IM .ONCE ONE


   Stop: 01/03/19 07:46


Ketorolac Tromethamine (Toradol) Confirm Administered Dose 30 mg .ROUTE .STK-

MED ONE


   Stop: 01/03/19 13:08


Ketorolac Tromethamine (Toradol)  30 mg IVPUSH ONETIME ONE


   Stop: 01/03/19 18:09


   Last Admin: 01/03/19 18:16 Dose:  30 mg


Lidocaine/Epinephrine (Xylocaine 1% With Epinephrine 1:100,000) Confirm 

Administered Dose 40 ml .ROUTE .STK-MED ONE


   Stop: 01/03/19 11:46


   Last Admin: 01/03/19 12:25 Dose:  30 ml


Metoclopramide HCl (Reglan)  10 mg IVPUSH ONETIME ONE


   Stop: 01/02/19 23:59


   Last Admin: 01/03/19 00:22 Dose:  10 mg


Midazolam HCl (Versed 1 Mg/Ml) Confirm Administered Dose 2 mg .ROUTE .STK-MED 

ONE


   Stop: 01/03/19 10:47


Neostigmine Methylsulfate (Neostigmine) Confirm Administered Dose 5 mg .ROUTE 

.STK-MED ONE


   Stop: 01/03/19 12:54


Ondansetron HCl (Zofran)  4 mg IVPUSH ONETIME ONE


   Stop: 01/03/19 02:05


   Last Admin: 01/03/19 02:10 Dose:  4 mg


Ondansetron HCl (Zofran) Confirm Administered Dose 4 mg .ROUTE .STK-MED ONE


   Stop: 01/03/19 10:47


Ondansetron HCl (Zofran)  4 mg IVPUSH ONETIME PRN


   PRN Reason: Nausea/Vomiting


   Stop: 01/03/19 18:00


Propofol (Diprivan  20 Ml) Confirm Administered Dose 200 mg .ROUTE .STK-MED ONE


   Stop: 01/03/19 10:47


Rocuronium Bromide (Zemuron) Confirm Administered Dose 50 mg .ROUTE .STK-MED ONE


   Stop: 01/03/19 10:47

## 2019-01-04 NOTE — [id number]
Post Anesthesia Note





- EVALUATION WITHIN 48HRS OF ANESTHETIC


Vital Signs in Normal Range: Yes


Patient Participated in Evaluation: Yes


Respiratory Function Stable: Yes


Airway Patent: Yes


Cardiovascular Function Stable: Yes


Hydration Status Stable: Yes


Pain Control Satisfactory: Yes


Nausea and Vomiting Control Satisfactory: Yes


Mental Status Recovered: Yes


Pulse Rate: 64


Resp Rate: 16


Temperature: 36.7 C


Blood Pressure: 103/69





- COMMENTS/OBSERVATIONS


Free Text/Narrative:: 





Patient discharged from hospital. no anesthesia complications noted.

## 2019-01-04 NOTE — [id number].[id number]
- General Info


Date of Service: 01/04/19


Date of Surgery/Procedure: 01/03/19


POD#: 1


Admission Diagnosis/Problem: Acute appendicitis


Functional Status: Reports: Pain Controlled, Tolerating Diet, Ambulating, 

Urinating, Other (doing well. denies nausea or vomiting)





- Patient Data


Vitals - Most Recent: 


 Last Vital Signs











Temp  36.7 C   01/04/19 07:52


 


Pulse  64   01/04/19 07:52


 


Resp  16   01/04/19 07:52


 


BP  103/69   01/04/19 07:52


 


Pulse Ox  94 L  01/04/19 07:52











Weight - Most Recent: 78.608 kg


I&O - Last 24 Hours: 


 Intake & Output











 01/03/19 01/04/19 01/04/19





 22:59 06:59 14:59


 


Intake Total 240 800 


 


Balance 240 800 











Med Orders - Current: 


 Current Medications





Hydrocodone Bitart/Acetaminophen (Norco 325-5 Mg)  1 tab PO Q4H PRN


   PRN Reason: Pain (severe 7-10)


   Last Admin: 01/04/19 07:49 Dose:  1 tab


Heparin Sodium (Porcine) (Heparin Sodium)  5,000 units SUBCUT Q8H [facility]OHINI


   Last Admin: 01/04/19 07:56 Dose:  5,000 units


Ibuprofen (Motrin)  600 mg PO Q6H PRN


   PRN Reason: Pain (moderate 4-6)


   Last Admin: 01/03/19 17:32 Dose:  600 mg


Metoclopramide HCl (Reglan)  10 mg IVPUSH Q6H PRN


   PRN Reason: Nausea





Discontinued Medications





Bupivacaine HCl/Epinephrine Bitart (Marcaine 0.5%/Epinephrine 1:200,000) 

Confirm Administered Dose 50 ml .ROUTE .STK-MED ONE


   Stop: 01/03/19 11:46


   Last Admin: 01/03/19 12:25 Dose:  30 ml


Fentanyl (Sublimaze) Confirm Administered Dose 250 mcg .ROUTE .STK-MED ONE


   Stop: 01/03/19 10:48


Fentanyl (Sublimaze)  50 mcg IVPUSH Q5M PRN


   PRN Reason: Pain


   Stop: 01/03/19 18:00


Glycopyrrolate () Confirm Administered Dose 1 mg .ROUTE .STK-MED ONE


   Stop: 01/03/19 12:54


Hydromorphone HCl (Dilaudid)  0.5 mg IVPUSH ONETIME ONE


   Stop: 01/02/19 23:59


   Last Admin: 01/03/19 00:22 Dose:  0.5 mg


Hydromorphone HCl (Dilaudid)  1 mg IVPUSH ONETIME ONE


   Stop: 01/03/19 02:05


   Last Admin: 01/03/19 02:11 Dose:  1 mg


Hydromorphone HCl (Dilaudid)  0.5 mg IVPUSH Q2H PRN


   PRN Reason: Pain


Hydromorphone HCl (Dilaudid)  0.5 mg IVPUSH Q10M PRN


   PRN Reason: Pain (severe 7-10)


   Stop: 01/03/19 18:00


Hydromorphone HCl (Dilaudid) Confirm Administered Dose 0.5 mg .ROUTE .K-MED 

ONE


   Stop: 01/03/19 12:39


Sodium Chloride (Normal Saline)  1,000 mls @ 150 mls/hr IV ASDIRECTED [facility]


   Last Admin: 01/03/19 00:21 Dose:  150 mls/hr


Cefoxitin Sodium 1 gm/ Premix  50 mls @ 100 mls/hr IV ONETIME ONE


   Stop: 01/03/19 02:53


   Last Admin: 01/03/19 02:44 Dose:  100 mls/hr


Dextrose/Sodium Chloride (Dextrose 5%-Normal Saline)  1,000 mls @ 150 mls/hr IV 

ASDIRECTED [facility]


   Last Admin: 01/03/19 07:26 Dose:  150 mls/hr


Cefoxitin Sodium 1 gm/ Premix  50 mls @ 100 mls/hr IV ONETIME ONE


   Stop: 01/03/19 10:59


   Last Admin: 01/03/19 10:44 Dose:  100 mls/hr


Lidocaine HCl (Xylocaine-Mpf 1%) Confirm Administered Dose 4 mls @ as directed 

.ROUTE .STK-MED ONE


   Stop: 01/03/19 10:47


Lactated Ringer's (Ringers, Lactated) Confirm Administered Dose 1,000 mls @ as 

directed .ROUTE .[facility]-MED ONE


   Stop: 01/03/19 13:06


Influenza Virus Vaccine (Pharmacy To Dose - Influenza Vaccine)  1 each IM 

ONETIME ONE


   Stop: 01/03/19 07:32


Influenza Virus Vaccine (Fluzone Quad [id number] Syringe)  60 mcg IM .ONCE ONE


   Stop: 01/03/19 07:46


Ketorolac Tromethamine (Toradol) Confirm Administered Dose 30 mg .ROUTE .STK-

MED ONE


   Stop: 01/03/19 13:08


Ketorolac Tromethamine (Toradol)  30 mg IVPUSH ONETIME ONE


   Stop: 01/03/19 18:09


   Last Admin: 01/03/19 18:16 Dose:  30 mg


Lidocaine/Epinephrine (Xylocaine 1% With Epinephrine 1:100,000) Confirm 

Administered Dose 40 ml .ROUTE .STK-MED ONE


   Stop: 01/03/19 11:46


   Last Admin: 01/03/19 12:25 Dose:  30 ml


Metoclopramide HCl (Reglan)  10 mg IVPUSH ONETIME ONE


   Stop: 01/02/19 23:59


   Last Admin: 01/03/19 00:22 Dose:  10 mg


Midazolam HCl (Versed 1 Mg/Ml) Confirm Administered Dose 2 mg .ROUTE .STK-MED 

ONE


   Stop: 01/03/19 10:47


Neostigmine Methylsulfate (Neostigmine) Confirm Administered Dose 5 mg .ROUTE 

.STK-MED ONE


   Stop: 01/03/19 12:54


Ondansetron HCl (Zofran)  4 mg IVPUSH ONETIME ONE


   Stop: 01/03/19 02:05


   Last Admin: 01/03/19 02:10 Dose:  4 mg


Ondansetron HCl (Zofran) Confirm Administered Dose 4 mg .ROUTE .STK-MED ONE


   Stop: 01/03/19 10:47


Ondansetron HCl (Zofran)  4 mg IVPUSH ONETIME PRN


   PRN Reason: Nausea/Vomiting


   Stop: 01/03/19 18:00


Propofol (Diprivan  20 Ml) Confirm Administered Dose 200 mg .ROUTE .STK-MED ONE


   Stop: 01/03/19 10:47


Rocuronium Bromide (Zemuron) Confirm Administered Dose 50 mg .ROUTE .STK-MED ONE


   Stop: 01/03/19 10:47











- Exam


Wound/Incisions: Dressing Dry and Intact, No Drainage.  No: Erythema


General: Alert


HEENT: Pupils Equal, EOMI


Neck: Supple


Lungs: Normal Respiratory Effort


GI/Abdominal Exam: Soft, Distended (mild), Tender (appropriately tender to 

palpation)


Skin: Warm, Dry, Intact


Psy/Mental Status: Alert, Normal Affect





- Problem List & Annotations


(1) Appendicitis, acute


SNOMED Code(s): 61197411


   Code(s): K35.80 - UNSPECIFIED ACUTE APPENDICITIS   Status: Acute   Current 

Visit: Yes   


Qualifiers: 


   Acute appendicitis type: with localized peritonitis   Appendicitis gangrene 

presence: without gangrene   Appendicitis perforation presence: without 

perforation   Appendicitis abscess presence: without abscess   Qualified Code(s)

: K35.30 - Acute appendicitis with localized peritonitis, without perforation 

or gangrene   





- Problem List Review


Problem List Initiated/Reviewed/Updated: Yes





- My Orders


Last 24 Hours: 


 Active Orders 24 hr











 Category Date Time Status


 


 Oxygen Therapy [RC] ASDIRECTED Care  01/03/19 12:17 Active


 


 Pulse Oximetry [RC] ASDIRECTED Care  01/03/19 12:17 Active


 


 Ready for Discharge [RC] PER UNIT ROUTINE Care  01/03/19 13:50 Active


 


 Ready for Discharge [RC] PER UNIT ROUTINE Care  01/04/19 07:33 Ordered


 


 Vital Signs [RC] Q4HR Care  01/03/19 12:17 Active


 


 Regular Diet [DIET] Diet  01/03/19 Dinner Active


 


 Acetaminophen/HYDROcodone [Norco 325-5 MG] Med  01/03/19 13:57 Active





 1 tab PO Q4H PRN   


 


 Heparin Sodium Med  01/03/19 16:00 Active





 5,000 units SUBCUT Q8H   


 


 Ibuprofen [Motrin] Med  01/03/19 19:00 Active





 600 mg PO Q6H PRN   


 


 Schedule Procedure [COMM] Urgent Oth  01/03/19 11:32 Ordered


 


 Sequential Compression Device [OM.PC] Routine Oth  01/03/19 11:32 Ordered








 Medication Orders





Hydrocodone Bitart/Acetaminophen (Norco 325-5 Mg)  1 tab PO Q4H PRN


   PRN Reason: Pain (severe 7-10)


   Last Admin: 01/04/19 07:49  Dose: 1 tab


   Admin: 01/04/19 00:25  Dose: 1 tab


   Admin: 01/03/19 20:03  Dose: 1 tab


   Admin: 01/03/19 15:15  Dose: 1 tab


Heparin Sodium (Porcine) (Heparin Sodium)  5,000 units SUBCUT Q8H [facility]


   Last Admin: 01/04/19 07:56  Dose: 5,000 units


   Admin: 01/04/19 00:27  Dose: 5,000 units


   Admin: 01/03/19 15:13  Dose: 5,000 units


Ibuprofen (Motrin)  600 mg PO Q6H PRN


   PRN Reason: Pain (moderate 4-6)


   Last Admin: 01/03/19 17:32  Dose: 600 mg


Metoclopramide HCl (Reglan)  10 mg IVPUSH Q6H PRN


   PRN Reason: Nausea











- Assessment


Assessment           (Free Text/Narrative):: 





32 y/o gentleman POD1 s/p laparoscopic appendectomy for acute appendicitis





- Plan


Plan                        (Free Text/Narrative):: 





- continue regular diet


- d/c IVF


- continue current pain regimen


- ambulate ad cynthia





Will discharge home. follow up in surgery clinic in 2 weeks





Argenis Berrios MD


General surgery